# Patient Record
Sex: FEMALE | Race: BLACK OR AFRICAN AMERICAN | Employment: UNEMPLOYED | URBAN - METROPOLITAN AREA
[De-identification: names, ages, dates, MRNs, and addresses within clinical notes are randomized per-mention and may not be internally consistent; named-entity substitution may affect disease eponyms.]

---

## 2017-07-26 ENCOUNTER — PATIENT OUTREACH (OUTPATIENT)
Dept: INTERNAL MEDICINE CLINIC | Age: 57
End: 2017-07-26

## 2017-07-26 NOTE — PROGRESS NOTES
Patient noted on the combined daily census list as having been seen in the ED at [facility] which is located in the [facility]. Unable to reach patient on phone number on file as it has been changed. Patient may have moved? NN will send a letter to address on file requesting that the patient contact the office to clarify if we are her PCP and to schedule a follow up appt from ED visit.  KIRILL

## 2017-07-26 NOTE — LETTER
7/26/2017 10:28 AM 
 
Ms. Mandy Reynolds [address] [facility] [place] [facility] [id number] I am a Nurse Navigator working with Dr. Regina Lewis. Part of my job is to follow up with patients who have been in the emergency department to see how they are feeling, answer any questions they may have about their visit and also make sure they have a follow-up appointment to see their primary care doctor. I have been unable to reach you by telephone and wanted to make sure that you scheduled a follow-up appointment to come in and talk to Dr. Regina Lewis  about your recent visit to the hospital. I did noted that your phone number has changed and you were seen at a hospital in [place]. Please call our office to schedule your appointment or let us know if you have moved or have a new PCP. In the meantime, if you have any questions or concerns, please feel free to call me on my direct line at Thank you for allowing us to participate in your care! Sincerely, Levi Abbott RN